# Patient Record
Sex: MALE | Race: WHITE | NOT HISPANIC OR LATINO | URBAN - METROPOLITAN AREA
[De-identification: names, ages, dates, MRNs, and addresses within clinical notes are randomized per-mention and may not be internally consistent; named-entity substitution may affect disease eponyms.]

---

## 2019-10-09 ENCOUNTER — OFFICE VISIT CONVERTED (OUTPATIENT)
Dept: UROLOGY | Facility: CLINIC | Age: 34
End: 2019-10-09
Attending: UROLOGY

## 2019-11-15 ENCOUNTER — PROCEDURE VISIT CONVERTED (OUTPATIENT)
Dept: UROLOGY | Facility: CLINIC | Age: 34
End: 2019-11-15
Attending: UROLOGY

## 2020-01-24 ENCOUNTER — OFFICE VISIT CONVERTED (OUTPATIENT)
Dept: UROLOGY | Facility: CLINIC | Age: 35
End: 2020-01-24
Attending: UROLOGY

## 2021-05-15 VITALS — HEIGHT: 72 IN | WEIGHT: 155 LBS | RESPIRATION RATE: 14 BRPM | BODY MASS INDEX: 20.99 KG/M2

## 2025-04-25 ENCOUNTER — OFFICE VISIT (OUTPATIENT)
Dept: SURGERY | Facility: CLINIC | Age: 40
End: 2025-04-25
Payer: COMMERCIAL

## 2025-04-25 VITALS
HEART RATE: 102 BPM | HEIGHT: 72 IN | DIASTOLIC BLOOD PRESSURE: 83 MMHG | SYSTOLIC BLOOD PRESSURE: 125 MMHG | TEMPERATURE: 98.6 F | OXYGEN SATURATION: 96 % | WEIGHT: 177 LBS | BODY MASS INDEX: 23.98 KG/M2

## 2025-04-25 DIAGNOSIS — L72.3 SEBACEOUS CYST: Primary | ICD-10-CM

## 2025-04-25 RX ORDER — LISDEXAMFETAMINE DIMESYLATE 60 MG/1
60 CAPSULE ORAL EVERY MORNING
COMMUNITY

## 2025-04-25 NOTE — PROGRESS NOTES
"Chief Complaint:  Cyst (Pt went to derm and they found something on the jawline area. )    Primary Care Provider: Provider, No Known    Referring Provider: Debi Gonzalez PA    History of Present Illness  Matias Mann is a 39 y.o. male referred by BRIGID Jackson.     Patient was seen [facility] on 4/11/25 and enlarged lymph node vs. cyst was identified at the left submandibular area.    The patient says that the area of concern is been present for a few years.  No pain or any problems at the area of concern.  No imaging.    Allergies: Patient has no known allergies.    Outpatient Medications Marked as Taking for the 4/25/25 encounter (Office Visit) with Brent Hernandez MD   Medication Sig Dispense Refill    lisdexamfetamine (VYVANSE) 60 MG capsule Take 1 capsule by mouth Every Morning         No past medical history on file.     Past Surgical History:    VASECTOMY    WISDOM TOOTH EXTRACTION       Family History: History reviewed. No pertinent family history.     Social History:  Social History     Tobacco Use    Smoking status: Every Day     Current packs/day: 0.50     Average packs/day: 0.5 packs/day for 22.3 years (11.2 ttl pk-yrs)     Types: Cigarettes     Start date: 1/1/2003    Smokeless tobacco: Never   Substance Use Topics    Alcohol use: Never       Objective     Vital Signs:  /83   Pulse 102   Temp 98.6 °F (37 °C) (Oral)   Ht 182.9 cm (72\")   Wt 80.3 kg (177 lb)   SpO2 96%   BMI 24.01 kg/m²   Respiratory:  breathing not labored, respiratory effort appears normal  Cardiovascular:  heart regular rate  Skin and subcutaneous tissue:  at the angle of the left mandible, there is a small cystic structure about 1.5 cm in diameter that is circumscribed soft and in the superficial subcutaneous tissue.  Skin overlying the area is normal-appearing.  Here alone    Assessment:  Sebaceous cyst    Plan:  I am confident that the area of concern near the angle of the patient's left mandible is a " sebaceous cyst.  Sebaceous cyst diagnosis was discussed.  The cyst is completely asymptomatic.  The patient's main concern was to get an opinion about whether he had a cyst or an enlarged lymph node.  Excision of the sebaceous cyst was offered.  Patient prefers to proceed with watchful observation.  He will see me again if something changes.      Brent Hernandez MD  04/25/2025    Electronically signed by Brent Hernandez MD, 04/25/25, 2:48 PM EDT.

## 2025-04-25 NOTE — LETTER
April 25, 2025     BRIGID Jackson  [facility] [address]    Patient: Matias Mann   YOB: 1985   Date of Visit: 4/25/2025       Hi Debi.    Thank you for referring Matias Mann to me for evaluation.  I saw the patient in my office today.  Please see the assessment and plan section at the bottom of my note included below for feedback.  Feel free to call me on my cell phone at [phone] with any questions.  Thank you.    Sincerely,  Brent Hernandez        CC: No Recipients    Brent Hernandez MD  04/25/25 1449  Sign when Signing Visit  Chief Complaint:  Cyst (Pt went to derm and they found something on the jawline area. )    Primary Care Provider: Provider, No Known    Referring Provider: Debi Gonzalez PA    History of Present Illness  Matias Mann is a 39 y.o. male referred by BRIGID Jackson.     Patient was seen Debi on 4/11/25 and enlarged lymph node vs. cyst was identified at the left submandibular area.    The patient says that the area of concern is been present for a few years.  No pain or any problems at the area of concern.  No imaging.    Allergies: Patient has no known allergies.    Outpatient Medications Marked as Taking for the 4/25/25 encounter (Office Visit) with Brent Hernandez MD   Medication Sig Dispense Refill   • lisdexamfetamine (VYVANSE) 60 MG capsule Take 1 capsule by mouth Every Morning         No past medical history on file.     Past Surgical History:   • VASECTOMY   • WISDOM TOOTH EXTRACTION       Family History: History reviewed. No pertinent family history.     Social History:  Social History     Tobacco Use   • Smoking status: Every Day     Current packs/day: 0.50     Average packs/day: 0.5 packs/day for 22.3 years (11.2 ttl pk-yrs)     Types: Cigarettes     Start date: 1/1/2003   • Smokeless tobacco: Never   Substance Use Topics   • Alcohol use: Never       Objective    Vital Signs:  /83   Pulse 102   Temp 98.6 °F  "(37 °C) (Oral)   Ht 182.9 cm (72\")   Wt 80.3 kg (177 lb)   SpO2 96%   BMI 24.01 kg/m²   Respiratory:  breathing not labored, respiratory effort appears normal  Cardiovascular:  heart regular rate  Skin and subcutaneous tissue:  at the angle of the left mandible, there is a small cystic structure about 1.5 cm in diameter that is circumscribed soft and in the superficial subcutaneous tissue.  Skin overlying the area is normal-appearing.  Here alone    Assessment:  Sebaceous cyst    Plan:  I am confident that the area of concern near the angle of the patient's left mandible is a sebaceous cyst.  Sebaceous cyst diagnosis was discussed.  The cyst is completely asymptomatic.  The patient's main concern was to get an opinion about whether he had a cyst or an enlarged lymph node.  Excision of the sebaceous cyst was offered.  Patient prefers to proceed with watchful observation.  He will see me again if something changes.      Brent Hernandez MD  04/25/2025    Electronically signed by Brent Hernandez MD, 04/25/25, 2:48 PM EDT.        "